# Patient Record
Sex: MALE | Race: WHITE | NOT HISPANIC OR LATINO
[De-identification: names, ages, dates, MRNs, and addresses within clinical notes are randomized per-mention and may not be internally consistent; named-entity substitution may affect disease eponyms.]

---

## 2021-05-06 ENCOUNTER — APPOINTMENT (OUTPATIENT)
Dept: PULMONOLOGY | Facility: HOSPITAL | Age: 51
End: 2021-05-06
Payer: COMMERCIAL

## 2021-05-06 VITALS — HEIGHT: 70 IN | BODY MASS INDEX: 29.35 KG/M2 | WEIGHT: 205 LBS

## 2021-05-06 DIAGNOSIS — Z72.89 OTHER PROBLEMS RELATED TO LIFESTYLE: ICD-10-CM

## 2021-05-06 DIAGNOSIS — Z78.9 OTHER SPECIFIED HEALTH STATUS: ICD-10-CM

## 2021-05-06 PROBLEM — Z00.00 ENCOUNTER FOR PREVENTIVE HEALTH EXAMINATION: Status: ACTIVE | Noted: 2021-05-06

## 2021-05-06 PROCEDURE — 99203 OFFICE O/P NEW LOW 30 MIN: CPT | Mod: 95

## 2021-05-06 NOTE — REASON FOR VISIT
[Home] : at home, [unfilled] , at the time of the visit. [Medical Office: ([facility])___] : at the medical office located in  [Verbal consent obtained from patient] : the patient, [unfilled] [Initial Evaluation] : an initial evaluation [FreeTextEntry1] : sleep apnea

## 2021-05-06 NOTE — HISTORY OF PRESENT ILLNESS
[FreeTextEntry1] : 50 year old man with history of mirta  is here in the sleep center to address sleep apnea.  Patient was diagnosed with sleep apnea many years ago and has been given CPAP.  Patient was using CPAP all these years but he hates it and does not want to use the CPAP anymore going forward.  I suggested him to continue to use the CPAP until we figure out an alternative option. Pts snoring improved.  Patient's bedtime is around 11.30 PM wakes up in the morning around 7 AM.  He feels rested when he wakes up.    He is not sleepy while driving.\par \par Today we talked about inspire therapy, oral appliance therapy and mandibular advancement surgery as options based on the degree of sleep apnea.\par \par Since the sleep study was many years old we will bring him in for a in lab sleep study to get an accurate reading to assess for the above options.\par \par

## 2021-05-06 NOTE — ASSESSMENT
[FreeTextEntry1] : 50-year-old man has obstructive sleep apnea and has been on CPAP for many years, but patient does not want to continue to use the CPAP anymore, he hates it and would like alternate options. \par \par Today we discussed about repeating a sleep study to assess the degree of sleep apnea also we talked about alternative options.  The alternative options for CPAP would be oral appliance therapy, inspire and mandibular advancement surgery based on his sleep study results.

## 2023-06-16 ENCOUNTER — APPOINTMENT (OUTPATIENT)
Dept: PULMONOLOGY | Facility: CLINIC | Age: 53
End: 2023-06-16
Payer: COMMERCIAL

## 2023-06-16 VITALS
DIASTOLIC BLOOD PRESSURE: 70 MMHG | BODY MASS INDEX: 29.35 KG/M2 | WEIGHT: 205 LBS | HEIGHT: 70 IN | SYSTOLIC BLOOD PRESSURE: 120 MMHG | HEART RATE: 70 BPM

## 2023-06-16 DIAGNOSIS — G47.33 OBSTRUCTIVE SLEEP APNEA (ADULT) (PEDIATRIC): ICD-10-CM

## 2023-06-16 PROCEDURE — 99213 OFFICE O/P EST LOW 20 MIN: CPT

## 2023-06-16 NOTE — HISTORY OF PRESENT ILLNESS
[FreeTextEntry1] : Terrell Peña\par 52 year old man with history of mirta  is here in the sleep center to address sleep apnea.  Patient was diagnosed with sleep apnea many years ago and has been given CPAP.  Patient has difficulty tolerating CPAP therapy.   Patient's bedtime is around 11.30 PM wakes up in the morning around 7 AM.  He feels rested when he wakes up.    He is not sleepy while driving.\par \par Today we talked about inspire therapy.\par \par Since the sleep study was many years old we will bring him in for a in lab sleep study to get an accurate reading to assess for inspire alessandra.\par \par

## 2023-06-16 NOTE — ASSESSMENT
[FreeTextEntry1] : 52 -year-old man has obstructive sleep apnea and has been on CPAP for many years, patient has difficulty using the cpap.\par \par Today we discussed about repeating a sleep study to assess the degree of sleep apnea also we talked about inspire therapy.  \par \par Discussed the procedure and next steps for inspire therapy.  We talked about AHI requirements and the BMI requirements for inspire therapy.  We also talked about drug-induced endoscopy and the procedure details.  Explained to him  about short-term side effects from the inspire along with tongue dysfunction and swallowing issues.  Explained to  him that most of the time these issues will resolve with time as the inflammation decreases.  We also talked about long-term complications of the inspire implant.  Patient understood all above and expressed understanding.\par

## 2023-10-03 ENCOUNTER — APPOINTMENT (OUTPATIENT)
Dept: UROLOGY | Facility: CLINIC | Age: 53
End: 2023-10-03
Payer: COMMERCIAL

## 2023-10-03 ENCOUNTER — TRANSCRIPTION ENCOUNTER (OUTPATIENT)
Age: 53
End: 2023-10-03

## 2023-10-03 VITALS
SYSTOLIC BLOOD PRESSURE: 141 MMHG | BODY MASS INDEX: 31.67 KG/M2 | WEIGHT: 209 LBS | HEIGHT: 68 IN | DIASTOLIC BLOOD PRESSURE: 90 MMHG | HEART RATE: 85 BPM

## 2023-10-03 DIAGNOSIS — Z80.42 FAMILY HISTORY OF MALIGNANT NEOPLASM OF PROSTATE: ICD-10-CM

## 2023-10-03 PROCEDURE — 99204 OFFICE O/P NEW MOD 45 MIN: CPT

## 2023-10-17 ENCOUNTER — RESULT REVIEW (OUTPATIENT)
Age: 53
End: 2023-10-17

## 2023-11-13 ENCOUNTER — APPOINTMENT (OUTPATIENT)
Dept: UROLOGY | Facility: CLINIC | Age: 53
End: 2023-11-13

## 2023-11-13 DIAGNOSIS — R68.89 OTHER GENERAL SYMPTOMS AND SIGNS: ICD-10-CM

## 2023-11-29 ENCOUNTER — APPOINTMENT (OUTPATIENT)
Dept: UROLOGY | Facility: CLINIC | Age: 53
End: 2023-11-29
Payer: COMMERCIAL

## 2023-11-29 PROCEDURE — 99441: CPT

## 2024-02-09 ENCOUNTER — RESULT REVIEW (OUTPATIENT)
Age: 54
End: 2024-02-09

## 2024-05-21 DIAGNOSIS — R31.29 OTHER MICROSCOPIC HEMATURIA: ICD-10-CM

## 2024-05-21 DIAGNOSIS — R97.20 ELEVATED PROSTATE, SPECIFIC ANTIGEN [PSA]: ICD-10-CM

## 2024-06-04 ENCOUNTER — APPOINTMENT (OUTPATIENT)
Dept: UROLOGY | Facility: CLINIC | Age: 54
End: 2024-06-04

## 2024-12-25 PROBLEM — F10.90 ALCOHOL USE: Status: ACTIVE | Noted: 2021-05-06
